# Patient Record
Sex: MALE | Race: ASIAN | NOT HISPANIC OR LATINO | Employment: STUDENT | URBAN - METROPOLITAN AREA
[De-identification: names, ages, dates, MRNs, and addresses within clinical notes are randomized per-mention and may not be internally consistent; named-entity substitution may affect disease eponyms.]

---

## 2024-05-31 ENCOUNTER — OFFICE VISIT (OUTPATIENT)
Dept: URGENT CARE | Facility: URGENT CARE | Age: 24
End: 2024-05-31
Payer: COMMERCIAL

## 2024-05-31 VITALS
HEART RATE: 93 BPM | DIASTOLIC BLOOD PRESSURE: 77 MMHG | TEMPERATURE: 97.8 F | WEIGHT: 119.8 LBS | RESPIRATION RATE: 18 BRPM | OXYGEN SATURATION: 99 % | SYSTOLIC BLOOD PRESSURE: 134 MMHG

## 2024-05-31 DIAGNOSIS — R35.0 URINARY FREQUENCY: ICD-10-CM

## 2024-05-31 DIAGNOSIS — R30.0 DYSURIA: Primary | ICD-10-CM

## 2024-05-31 LAB
ALBUMIN UR-MCNC: NEGATIVE MG/DL
APPEARANCE UR: CLEAR
BILIRUB UR QL STRIP: NEGATIVE
COLOR UR AUTO: YELLOW
GLUCOSE BLD-MCNC: 101 MG/DL (ref 60–99)
GLUCOSE UR STRIP-MCNC: NEGATIVE MG/DL
HBA1C MFR BLD: 5.4 % (ref 0–5.6)
HGB UR QL STRIP: NEGATIVE
KETONES UR STRIP-MCNC: NEGATIVE MG/DL
LEUKOCYTE ESTERASE UR QL STRIP: NEGATIVE
NITRATE UR QL: NEGATIVE
PH UR STRIP: 7 [PH] (ref 5–7)
SP GR UR STRIP: 1.01 (ref 1–1.03)
UROBILINOGEN UR STRIP-ACNC: 0.2 E.U./DL

## 2024-05-31 PROCEDURE — 81003 URINALYSIS AUTO W/O SCOPE: CPT | Performed by: PHYSICIAN ASSISTANT

## 2024-05-31 PROCEDURE — 36415 COLL VENOUS BLD VENIPUNCTURE: CPT | Performed by: PHYSICIAN ASSISTANT

## 2024-05-31 PROCEDURE — 99203 OFFICE O/P NEW LOW 30 MIN: CPT | Performed by: PHYSICIAN ASSISTANT

## 2024-05-31 PROCEDURE — 83036 HEMOGLOBIN GLYCOSYLATED A1C: CPT | Performed by: PHYSICIAN ASSISTANT

## 2024-05-31 PROCEDURE — 82947 ASSAY GLUCOSE BLOOD QUANT: CPT | Performed by: PHYSICIAN ASSISTANT

## 2024-05-31 RX ORDER — SULFAMETHOXAZOLE/TRIMETHOPRIM 800-160 MG
1 TABLET ORAL 2 TIMES DAILY
Qty: 20 TABLET | Refills: 0 | Status: SHIPPED | OUTPATIENT
Start: 2024-05-31 | End: 2024-06-10

## 2024-05-31 ASSESSMENT — PAIN SCALES - GENERAL: PAINLEVEL: MODERATE PAIN (4)

## 2024-06-03 ENCOUNTER — OFFICE VISIT (OUTPATIENT)
Dept: FAMILY MEDICINE | Facility: CLINIC | Age: 24
End: 2024-06-03
Payer: COMMERCIAL

## 2024-06-03 ENCOUNTER — TELEPHONE (OUTPATIENT)
Dept: FAMILY MEDICINE | Facility: CLINIC | Age: 24
End: 2024-06-03

## 2024-06-03 VITALS
HEART RATE: 101 BPM | WEIGHT: 117 LBS | BODY MASS INDEX: 19.49 KG/M2 | DIASTOLIC BLOOD PRESSURE: 73 MMHG | RESPIRATION RATE: 16 BRPM | OXYGEN SATURATION: 100 % | HEIGHT: 65 IN | TEMPERATURE: 99.8 F | SYSTOLIC BLOOD PRESSURE: 122 MMHG

## 2024-06-03 DIAGNOSIS — K59.00 CONSTIPATION, UNSPECIFIED CONSTIPATION TYPE: ICD-10-CM

## 2024-06-03 DIAGNOSIS — N50.811 RIGHT TESTICULAR PAIN: Primary | ICD-10-CM

## 2024-06-03 PROCEDURE — 99214 OFFICE O/P EST MOD 30 MIN: CPT

## 2024-06-03 ASSESSMENT — PATIENT HEALTH QUESTIONNAIRE - PHQ9
10. IF YOU CHECKED OFF ANY PROBLEMS, HOW DIFFICULT HAVE THESE PROBLEMS MADE IT FOR YOU TO DO YOUR WORK, TAKE CARE OF THINGS AT HOME, OR GET ALONG WITH OTHER PEOPLE: NOT DIFFICULT AT ALL
SUM OF ALL RESPONSES TO PHQ QUESTIONS 1-9: 4
SUM OF ALL RESPONSES TO PHQ QUESTIONS 1-9: 4

## 2024-06-03 NOTE — PROGRESS NOTES
Assessment & Plan     Right testicular pain  Was seen at urgent care 5/31 for urinary frequency and pain. UA and hgbA1C normal and was prescribed 10 days of bactrim. Patient reports improvement to back pain but continues to have testicular pain, has been taking antibiotics as prescribed. Chlamydia and gonorrhea tests collected. Differential diagnosis includes epididymitis, hernia, testicular torsion or constipation. Low suspicion for hernia or testicular torsion but testicular ultrasound ordered for further evaluation. Plan for patient to continue antibiotics as prescribed. Discussed with patient that if he develops significant pain, to go to ER for further evaluation.   - US Testicular & Scrotum w Doppler Ltd; Future  - NEISSERIA GONORRHOEA PCR; Future  - CHLAMYDIA TRACHOMATIS PCR; Future  - Neisseria gonorrhoeae PCR  - Chlamydia trachomatis PCR    Constipation, unspecified constipation type  Continues to have daily stools but 1 and 2 on bristol stool chart. Reports that this has been going on for two weeks. Has not used any over the counter treatments. Plan for patient to use miralax to help with hard stools.     Deann Felder is a 24 year old, presenting for the following health issues:  Follow Up (UC visit)      6/3/2024     3:23 PM   Additional Questions   Roomed by peg corona     History of Present Illness       Reason for visit:  Frquent urination and testicle pain    He eats 0-1 servings of fruits and vegetables daily.He consumes 0 sweetened beverage(s) daily.He exercises with enough effort to increase his heart rate 9 or less minutes per day.  He exercises with enough effort to increase his heart rate 3 or less days per week.   He is taking medications regularly.         ED/UC Followup:    Facility:  [facility]  Date of visit: 5/31/24  Reason for visit: Urinary problem  Current Status: rt testicle pain, rt pelvic pain  Genitourinary - Male  Onset/Duration: 5/30/24  Description:   Dysuria (painful  "urination): No}  Hematuria (blood in urine): No  Frequency: YES  Waking at night to urinate: No  Hesitancy (delay in urine): No  Retention (unable to empty): No  Decrease in urinary flow: No  Incontinence: No  Progression of Symptoms:  testicle pain worsening, back pain gone  Accompanying Signs & Symptoms:  Fever: No  Back/Flank pain: No  Urethral discharge: No  Testicle lumps/masses/pain: YES- pain  Nausea and/or vomiting: No  Abdominal pain: rt pelvic pain  History:   History of frequent UTI s: No  History of kidney stones: No  History of hernias: No  Personal or Family history of Prostate problems: No  Sexually active: No  Precipitating or alleviating factors: does have some dribbling, pt reports feeling dull  Therapies tried and outcome: abx    Right testicular that extends to right groin. Has not had surgery on area, First started Thursday, went into urgent care on Friday. Was noticing that he was having frequent urination. Testicular pain started Saturday night and back pain has since resolved. Has had hard stools for two weeks, has stools daily possibly constipated. Stools have been a type 1 or 2 on bristol stool chart for the last two weeks, no blood in stools, or black stools. Declines being sexually active. Has not noticed any lumps or masses in testicles. Pain is mostly right groin. Significant pain when walking 8/10. When at rest, pain improves.         Objective    /73 (BP Location: Right arm, Patient Position: Sitting, Cuff Size: Adult Small)   Pulse 101   Temp 99.8  F (37.7  C) (Temporal)   Resp 16   Ht 1.651 m (5' 5\")   Wt 53.1 kg (117 lb)   SpO2 100%   BMI 19.47 kg/m    Body mass index is 19.47 kg/m .  Physical Exam   GENERAL: alert and no distress  ABDOMEN: soft, nontender, no hepatosplenomegaly, no masses and bowel sounds normal   (male): testicles normal without atrophy or masses, no hernias, and penis normal without urethral discharge, no change in pain when lifting testicles up. "   SKIN: no suspicious lesions or rashes            Signed Electronically by: CAMILA Landaverde CNP

## 2024-06-03 NOTE — TELEPHONE ENCOUNTER
Reason for Call:  Appointment Request    Patient requesting this type of appt:  Hospital/ED Follow-Up     Requested provider:  Open     Reason patient unable to be scheduled: Not within requested timeframe    When does patient want to be seen/preferred time: 1-2 days    Comments: He is looking for later today or tomorrow for a follow up     Could we send this information to you in [employer] or would you prefer to receive a phone call?:   Patient would prefer a phone call   Okay to leave a detailed message?: Yes at Cell number on file:    Telephone Information:   Mobile [phone]       Call taken on 6/3/2024 at 11:02 AM by Bertha Gtz

## 2024-06-05 ENCOUNTER — ANCILLARY PROCEDURE (OUTPATIENT)
Dept: ULTRASOUND IMAGING | Facility: CLINIC | Age: 24
End: 2024-06-05
Payer: COMMERCIAL

## 2024-06-05 ENCOUNTER — LAB (OUTPATIENT)
Dept: LAB | Facility: CLINIC | Age: 24
End: 2024-06-05
Payer: COMMERCIAL

## 2024-06-05 DIAGNOSIS — N50.811 RIGHT TESTICULAR PAIN: ICD-10-CM

## 2024-06-05 LAB
C TRACH DNA SPEC QL NAA+PROBE: NEGATIVE
N GONORRHOEA DNA SPEC QL NAA+PROBE: NEGATIVE

## 2024-06-05 PROCEDURE — 87591 N.GONORRHOEAE DNA AMP PROB: CPT

## 2024-06-05 PROCEDURE — 76870 US EXAM SCROTUM: CPT | Mod: TC | Performed by: RADIOLOGY

## 2024-06-05 PROCEDURE — 93976 VASCULAR STUDY: CPT | Mod: TC | Performed by: RADIOLOGY

## 2024-06-05 PROCEDURE — 87491 CHLMYD TRACH DNA AMP PROBE: CPT

## 2024-06-10 ENCOUNTER — OFFICE VISIT (OUTPATIENT)
Dept: FAMILY MEDICINE | Facility: CLINIC | Age: 24
End: 2024-06-10
Payer: COMMERCIAL

## 2024-06-10 VITALS
SYSTOLIC BLOOD PRESSURE: 123 MMHG | RESPIRATION RATE: 12 BRPM | TEMPERATURE: 99.8 F | BODY MASS INDEX: 19.3 KG/M2 | DIASTOLIC BLOOD PRESSURE: 79 MMHG | HEART RATE: 109 BPM | OXYGEN SATURATION: 100 % | WEIGHT: 116 LBS

## 2024-06-10 DIAGNOSIS — M54.9 ACUTE BILATERAL BACK PAIN, UNSPECIFIED BACK LOCATION: ICD-10-CM

## 2024-06-10 DIAGNOSIS — N41.0 ACUTE PROSTATITIS: Primary | ICD-10-CM

## 2024-06-10 DIAGNOSIS — R35.0 URINARY FREQUENCY: ICD-10-CM

## 2024-06-10 LAB
ALBUMIN UR-MCNC: NEGATIVE MG/DL
ANION GAP SERPL CALCULATED.3IONS-SCNC: 11 MMOL/L (ref 7–15)
APPEARANCE UR: CLEAR
BILIRUB UR QL STRIP: NEGATIVE
BUN SERPL-MCNC: 7.7 MG/DL (ref 6–20)
CALCIUM SERPL-MCNC: 9.2 MG/DL (ref 8.6–10)
CHLORIDE SERPL-SCNC: 101 MMOL/L (ref 98–107)
COLOR UR AUTO: YELLOW
CREAT SERPL-MCNC: 1.04 MG/DL (ref 0.67–1.17)
DEPRECATED HCO3 PLAS-SCNC: 23 MMOL/L (ref 22–29)
EGFRCR SERPLBLD CKD-EPI 2021: >90 ML/MIN/1.73M2
GLUCOSE SERPL-MCNC: 96 MG/DL (ref 70–99)
GLUCOSE UR STRIP-MCNC: NEGATIVE MG/DL
HGB UR QL STRIP: NEGATIVE
KETONES UR STRIP-MCNC: NEGATIVE MG/DL
LEUKOCYTE ESTERASE UR QL STRIP: NEGATIVE
NITRATE UR QL: NEGATIVE
PH UR STRIP: 6 [PH] (ref 5–7)
POTASSIUM SERPL-SCNC: 4.7 MMOL/L (ref 3.4–5.3)
RBC #/AREA URNS AUTO: NORMAL /HPF
SODIUM SERPL-SCNC: 135 MMOL/L (ref 135–145)
SP GR UR STRIP: <=1.005 (ref 1–1.03)
UROBILINOGEN UR STRIP-ACNC: 0.2 E.U./DL
WBC #/AREA URNS AUTO: NORMAL /HPF

## 2024-06-10 PROCEDURE — 36415 COLL VENOUS BLD VENIPUNCTURE: CPT

## 2024-06-10 PROCEDURE — 85025 COMPLETE CBC W/AUTO DIFF WBC: CPT

## 2024-06-10 PROCEDURE — 99213 OFFICE O/P EST LOW 20 MIN: CPT

## 2024-06-10 PROCEDURE — 81001 URINALYSIS AUTO W/SCOPE: CPT

## 2024-06-10 PROCEDURE — 80048 BASIC METABOLIC PNL TOTAL CA: CPT

## 2024-06-10 RX ORDER — CIPROFLOXACIN 500 MG/1
500 TABLET, FILM COATED ORAL 2 TIMES DAILY
Qty: 56 TABLET | Refills: 0 | Status: SHIPPED | OUTPATIENT
Start: 2024-06-10 | End: 2024-07-02

## 2024-06-10 ASSESSMENT — ENCOUNTER SYMPTOMS: BACK PAIN: 1

## 2024-06-10 NOTE — PROGRESS NOTES
Assessment & Plan     Acute prostatitis  Pain when palpating prostate. Symptoms most likely prostatitis. Testicular ultrasound completed a couple of days ago and negative, Chlamydia and Gonorrhea test negative. Will completed UA, CBC and BMP for rule out of other cause. Patient has already been on bactrim but states his symptoms has worsened since being on it so will switch to cipro and plan for him to be on this for one month.   - ciprofloxacin (CIPRO) 500 MG tablet; Take 1 tablet (500 mg) by mouth 2 times daily for 28 days    Urinary frequency  - CBC with platelets and differential; Future  - Basic metabolic panel  (Ca, Cl, CO2, Creat, Gluc, K, Na, BUN); Future  - UA with Microscopic reflex to Culture - lab collect; Future  - CBC with platelets and differential  - Basic metabolic panel  (Ca, Cl, CO2, Creat, Gluc, K, Na, BUN)  - UA with Microscopic reflex to Culture - lab collect  - UA Microscopic with Reflex to Culture    Acute bilateral back pain, unspecified back location  Pain to pelvic lower back, low suspicion for kidney involvement due to location of pain.   - CBC with platelets and differential; Future  - Basic metabolic panel  (Ca, Cl, CO2, Creat, Gluc, K, Na, BUN); Future  - UA with Microscopic reflex to Culture - lab collect; Future  - CBC with platelets and differential  - Basic metabolic panel  (Ca, Cl, CO2, Creat, Gluc, K, Na, BUN)  - UA with Microscopic reflex to Culture - lab collect  - UA Microscopic with Reflex to Culture      Subjective   Bradford is a 24 year old, presenting for the following health issues:  Back Pain (Lt side worse back pain), Flank Pain (Lt side), and Urinary Frequency      6/10/2024     9:45 AM   Additional Questions   Roomed by peg corona     Back Pain            Genitourinary - Male  Onset/Duration: 1+ week  Description:   Dysuria (painful urination): No}  Hematuria (blood in urine): No  Frequency: YES-urinary dribbling  Waking at night to urinate: No  Hesitancy (delay in  urine): No  Retention (unable to empty): YES  Decrease in urinary flow: No  Incontinence: No  Progression of Symptoms:  back pain worsening and having fevers  Accompanying Signs & Symptoms:  Fever: YES: yesterday 99.8 and slight fever,   Back/Flank pain: YES-left and right side  Urethral discharge: No  Testicle lumps/masses/pain: No  Nausea and/or vomiting: No  Abdominal pain: lt side flank pain  History:   History of frequent UTI s: No  History of kidney stones: No  History of hernias: No  Personal or Family history of Prostate problems: No  Sexually active: No  Precipitating or alleviating factors: pt reports that his back pain has worsened, lt side pain and he has developed fever and chills.  Fever yesterday and better today.    Therapies tried and outcome: course of antibiotics - bactrim    Has two doses of bactrim left. Symptoms have not improved. Had low grade temp the other day. Testicular pain has resolved. Dad has a history of kidney stones but no reports of blood in urine.         Objective    /79 (BP Location: Right arm, Patient Position: Sitting, Cuff Size: Adult Regular)   Pulse 109   Temp 99.8  F (37.7  C) (Temporal)   Resp 12   Wt 52.6 kg (116 lb)   SpO2 100%   BMI 19.30 kg/m    Body mass index is 19.3 kg/m .  Physical Exam   GENERAL: alert and no distress  RESP: lungs clear to auscultation - no rales, rhonchi or wheezes  CV: regular rate and rhythm, normal S1 S2, no S3 or S4, no murmur, click or rub, no peripheral edema  ABDOMEN: soft, nontender, no hepatosplenomegaly, no masses and bowel sounds normal  RECTAL: normal sphincter tone, no rectal masses and prostate boggy, tenderness present          Signed Electronically by: CAMILA Landaverde CNP

## 2024-06-11 LAB
ACANTHOCYTES BLD QL SMEAR: ABNORMAL
AUER BODIES BLD QL SMEAR: ABNORMAL
BASO STIPL BLD QL SMEAR: ABNORMAL
BASOPHILS # BLD AUTO: 0 10E3/UL (ref 0–0.2)
BASOPHILS NFR BLD AUTO: 1 %
BITE CELLS BLD QL SMEAR: ABNORMAL
BLISTER CELLS BLD QL SMEAR: ABNORMAL
BURR CELLS BLD QL SMEAR: ABNORMAL
DACRYOCYTES BLD QL SMEAR: ABNORMAL
ELLIPTOCYTES BLD QL SMEAR: SLIGHT
EOSINOPHIL # BLD AUTO: 0.1 10E3/UL (ref 0–0.7)
EOSINOPHIL NFR BLD AUTO: 2 %
ERYTHROCYTE [DISTWIDTH] IN BLOOD BY AUTOMATED COUNT: 18.3 % (ref 10–15)
FRAGMENTS BLD QL SMEAR: SLIGHT
GIANT PLATELETS BLD QL SMEAR: ABNORMAL
HCT VFR BLD AUTO: 41.8 % (ref 40–53)
HGB BLD-MCNC: 12.9 G/DL (ref 13.3–17.7)
HGB C CRYSTALS: ABNORMAL
HOWELL-JOLLY BOD BLD QL SMEAR: ABNORMAL
IMM GRANULOCYTES # BLD: 0 10E3/UL
IMM GRANULOCYTES NFR BLD: 0 %
LYMPHOCYTES # BLD AUTO: 0.7 10E3/UL (ref 0.8–5.3)
LYMPHOCYTES NFR BLD AUTO: 15 %
MCH RBC QN AUTO: 18.8 PG (ref 26.5–33)
MCHC RBC AUTO-ENTMCNC: 30.9 G/DL (ref 31.5–36.5)
MCV RBC AUTO: 61 FL (ref 78–100)
MONOCYTES # BLD AUTO: 0.8 10E3/UL (ref 0–1.3)
MONOCYTES NFR BLD AUTO: 17 %
NEUTROPHILS # BLD AUTO: 3.1 10E3/UL (ref 1.6–8.3)
NEUTROPHILS NFR BLD AUTO: 65 %
NEUTS HYPERSEG BLD QL SMEAR: ABNORMAL
NRBC # BLD AUTO: 0 10E3/UL
NRBC BLD AUTO-RTO: 0 /100
PATH REV: ABNORMAL
PLAT MORPH BLD: ABNORMAL
PLATELET # BLD AUTO: 154 10E3/UL (ref 150–450)
POLYCHROMASIA BLD QL SMEAR: ABNORMAL
RBC # BLD AUTO: 6.87 10E6/UL (ref 4.4–5.9)
RBC AGGLUT BLD QL: ABNORMAL
RBC MORPH BLD: ABNORMAL
ROULEAUX BLD QL SMEAR: ABNORMAL
SICKLE CELLS BLD QL SMEAR: ABNORMAL
SMUDGE CELLS BLD QL SMEAR: ABNORMAL
SPHEROCYTES BLD QL SMEAR: ABNORMAL
STOMATOCYTES BLD QL SMEAR: ABNORMAL
TARGETS BLD QL SMEAR: ABNORMAL
TOXIC GRANULES BLD QL SMEAR: ABNORMAL
VARIANT LYMPHS BLD QL SMEAR: ABNORMAL
WBC # BLD AUTO: 4.7 10E3/UL (ref 4–11)

## 2024-06-11 NOTE — RESULT ENCOUNTER NOTE
Khang Felder,     Your kidney and urine labs came back normal. You also do not have elevated WBC count which is good.    Your hemoglobin is slightly low and looks like you have have low iron. I recommend that you follow up in clinic in the next couple of weeks to discuss this further and possibly complete more blood work.     If you have any questions send me a message on [employer].     ROBYN Valladares

## 2024-07-02 ENCOUNTER — OFFICE VISIT (OUTPATIENT)
Dept: FAMILY MEDICINE | Facility: CLINIC | Age: 24
End: 2024-07-02
Payer: COMMERCIAL

## 2024-07-02 ENCOUNTER — MYC MEDICAL ADVICE (OUTPATIENT)
Dept: FAMILY MEDICINE | Facility: CLINIC | Age: 24
End: 2024-07-02

## 2024-07-02 VITALS
SYSTOLIC BLOOD PRESSURE: 131 MMHG | HEART RATE: 91 BPM | OXYGEN SATURATION: 100 % | BODY MASS INDEX: 19.38 KG/M2 | WEIGHT: 120.6 LBS | TEMPERATURE: 98.5 F | HEIGHT: 66 IN | RESPIRATION RATE: 12 BRPM | DIASTOLIC BLOOD PRESSURE: 77 MMHG

## 2024-07-02 DIAGNOSIS — Z13.0 SCREENING FOR IRON DEFICIENCY ANEMIA: ICD-10-CM

## 2024-07-02 DIAGNOSIS — M79.10 MUSCLE PAIN: ICD-10-CM

## 2024-07-02 DIAGNOSIS — N41.0 ACUTE PROSTATITIS: Primary | ICD-10-CM

## 2024-07-02 DIAGNOSIS — R21 RASH: ICD-10-CM

## 2024-07-02 DIAGNOSIS — E61.1 IRON DEFICIENCY: Primary | ICD-10-CM

## 2024-07-02 LAB
ALBUMIN UR-MCNC: NEGATIVE MG/DL
APPEARANCE UR: CLEAR
BASOPHILS # BLD AUTO: 0 10E3/UL (ref 0–0.2)
BASOPHILS NFR BLD AUTO: 1 %
BILIRUB UR QL STRIP: NEGATIVE
COLOR UR AUTO: YELLOW
ELLIPTOCYTES BLD QL SMEAR: SLIGHT
EOSINOPHIL # BLD AUTO: 0.1 10E3/UL (ref 0–0.7)
EOSINOPHIL NFR BLD AUTO: 1 %
ERYTHROCYTE [DISTWIDTH] IN BLOOD BY AUTOMATED COUNT: 18.7 % (ref 10–15)
FERRITIN SERPL-MCNC: 44 NG/ML (ref 31–409)
FRAGMENTS BLD QL SMEAR: SLIGHT
GLUCOSE UR STRIP-MCNC: NEGATIVE MG/DL
HCT VFR BLD AUTO: 41.2 % (ref 40–53)
HGB BLD-MCNC: 12.4 G/DL (ref 13.3–17.7)
HGB UR QL STRIP: NEGATIVE
IMM GRANULOCYTES # BLD: 0.1 10E3/UL
IMM GRANULOCYTES NFR BLD: 1 %
IRON BINDING CAPACITY (ROCHE): 335 UG/DL (ref 240–430)
IRON SATN MFR SERPL: 25 % (ref 15–46)
IRON SERPL-MCNC: 83 UG/DL (ref 61–157)
KETONES UR STRIP-MCNC: NEGATIVE MG/DL
LEUKOCYTE ESTERASE UR QL STRIP: NEGATIVE
LYMPHOCYTES # BLD AUTO: 1.6 10E3/UL (ref 0.8–5.3)
LYMPHOCYTES NFR BLD AUTO: 25 %
MCH RBC QN AUTO: 18.6 PG (ref 26.5–33)
MCHC RBC AUTO-ENTMCNC: 30.1 G/DL (ref 31.5–36.5)
MCV RBC AUTO: 62 FL (ref 78–100)
MONOCYTES # BLD AUTO: 0.5 10E3/UL (ref 0–1.3)
MONOCYTES NFR BLD AUTO: 9 %
NEUTROPHILS # BLD AUTO: 3.9 10E3/UL (ref 1.6–8.3)
NEUTROPHILS NFR BLD AUTO: 64 %
NITRATE UR QL: NEGATIVE
NRBC # BLD AUTO: 0 10E3/UL
NRBC BLD AUTO-RTO: 0 /100
PH UR STRIP: 5.5 [PH] (ref 5–7)
PLAT MORPH BLD: ABNORMAL
PLATELET # BLD AUTO: 169 10E3/UL (ref 150–450)
RBC # BLD AUTO: 6.67 10E6/UL (ref 4.4–5.9)
RBC MORPH BLD: ABNORMAL
SP GR UR STRIP: 1.01 (ref 1–1.03)
TARGETS BLD QL SMEAR: SLIGHT
UROBILINOGEN UR STRIP-ACNC: 0.2 E.U./DL
WBC # BLD AUTO: 6.2 10E3/UL (ref 4–11)

## 2024-07-02 PROCEDURE — 82728 ASSAY OF FERRITIN: CPT

## 2024-07-02 PROCEDURE — 36415 COLL VENOUS BLD VENIPUNCTURE: CPT

## 2024-07-02 PROCEDURE — G2211 COMPLEX E/M VISIT ADD ON: HCPCS

## 2024-07-02 PROCEDURE — 99214 OFFICE O/P EST MOD 30 MIN: CPT

## 2024-07-02 PROCEDURE — 85025 COMPLETE CBC W/AUTO DIFF WBC: CPT

## 2024-07-02 PROCEDURE — 81003 URINALYSIS AUTO W/O SCOPE: CPT

## 2024-07-02 PROCEDURE — 83540 ASSAY OF IRON: CPT

## 2024-07-02 PROCEDURE — 83550 IRON BINDING TEST: CPT

## 2024-07-02 RX ORDER — CIPROFLOXACIN 500 MG/1
500 TABLET, FILM COATED ORAL 2 TIMES DAILY
Qty: 28 TABLET | Refills: 0 | Status: SHIPPED | OUTPATIENT
Start: 2024-07-02 | End: 2024-07-16

## 2024-07-02 NOTE — RESULT ENCOUNTER NOTE
Khang Felder,    Your iron levels came back on the lower side of normal. Because of this, I recommend that you take a daily multivitamin with iron in it to increase your iron levels.     Your WBC came back normal indicating no infection which is good. Continue to take the Cipro though as we discussed in clinic.     As for you muscle aches while taking the antibiotic, I recommend taking it easy with exercise. You can continue to run but I wouldn't run extremely long distances.     Let me know if you have any questions and I will see you in a couple of weeks.     Shawna

## 2024-07-02 NOTE — PROGRESS NOTES
Assessment & Plan     Acute prostatitis  Patient continues to have mild symptoms of prostatitis but symptoms have improved. Will be finished with cipro within the next 4-5 days (Will have been on for 28 days). Because of this, will extend cipro for two more weeks for a total of 6 weeks of treatment. Will get a UA for further evaluation. Plan for patient to follow up in 2 weeks for evaluation of discontinuation of symptoms.   - ciprofloxacin (CIPRO) 500 MG tablet; Take 1 tablet (500 mg) by mouth 2 times daily for 14 days  - UA Macroscopic with reflex to Microscopic and Culture - Lab Collect; Future  - UA Macroscopic with reflex to Microscopic and Culture - Lab Collect    Muscle pain  Most likely due to cipro. Appears to be mild and has moved from wrist to shoulders. Encouraged patient to not complete strenuous exercise to prevent tendon rupture associated with cipro.     Rash  Itching to beard. Uses night face wash. Encourage patient to use thick moisturizer to prevent dryness as this may be causing itching.     Screening for iron deficiency anemia  Previous CBC indicated Elliptocytes and possibly iron deficiency anemia. CBC, ferritin and iron studies ordered for further evaluation.   - Ferritin; Future  - Iron & Iron Binding Capacity; Future  - CBC with platelets and differential; Future  - Ferritin  - Iron & Iron Binding Capacity  - CBC with platelets and differential    The longitudinal plan of care for the diagnosis(es)/condition(s) as documented were addressed during this visit. Due to the added complexity in care, I will continue to support Bradford in the subsequent management and with ongoing continuity of care.    Subjective   Bradford is a 24 year old, presenting for the following health issues:  Follow Up (prostatitis), Derm Problem (Itchy face), Muscle Pain (Shoulders and arms), and Chills (2 weeks)      7/2/2024     7:48 AM   Additional Questions   Roomed by peg corona     History of Present Illness  "      Reason for visit:  Follow up and muscle pain    He eats 2-3 servings of fruits and vegetables daily.He consumes 0 sweetened beverage(s) daily.He exercises with enough effort to increase his heart rate 9 or less minutes per day.  He exercises with enough effort to increase his heart rate 3 or less days per week.   He is taking medications regularly.         Pt reports itchiness on face since last week- no rash or redness. Comes and goes. No redness or bumps comes and goes.   Pt reports muscle aches in shoulders and arms for 1 week.  Shoulders just recently but arm pain went away.  Shoulder bilateral, aching, mild, no strenuous activity.   Pt reports chills for 2 weeks in the morning lasting only 10-15 minutes.   Pt reports enlarged prostate since last week and frequent urination has not improved.  Stream is weaker.     Pain has improved, when sitting and standing for a prolonged period of time pain occurs. Drinking a lot of water.         Objective    /77 (BP Location: Right arm, Patient Position: Sitting, Cuff Size: Adult Regular)   Pulse 91   Temp 98.5  F (36.9  C) (Temporal)   Resp 12   Ht 1.664 m (5' 5.5\")   Wt 54.7 kg (120 lb 9.6 oz)   SpO2 100%   BMI 19.76 kg/m    Body mass index is 19.76 kg/m .  Physical Exam   GENERAL: alert and no distress  RESP: lungs clear to auscultation - no rales, rhonchi or wheezes  CV: regular rate and rhythm, normal S1 S2, no S3 or S4, no murmur, click or rub, no peripheral edema  MS: no gross musculoskeletal defects noted, no edema  SKIN: no suspicious lesions or rashes            Signed Electronically by: CAMILA Landaverde CNP    "

## 2024-07-03 PROBLEM — E61.1 IRON DEFICIENCY: Status: ACTIVE | Noted: 2024-07-03

## 2024-07-03 RX ORDER — MULTIPLE VITAMINS W/ MINERALS TAB 9MG-400MCG
1 TAB ORAL DAILY
Qty: 90 TABLET | Refills: 3 | COMMUNITY
End: 2024-07-03

## 2024-07-03 RX ORDER — MULTIPLE VITAMINS W/ MINERALS TAB 9MG-400MCG
1 TAB ORAL DAILY
Qty: 90 TABLET | Refills: 3 | Status: SHIPPED | OUTPATIENT
Start: 2024-07-03 | End: 2024-09-04

## 2024-07-03 NOTE — TELEPHONE ENCOUNTER
"Result note from labs completed 7/2/24      \"Hi Georginayan,     Your iron levels came back on the lower side of normal. Because of this, I recommend that you take a daily multivitamin with iron in it to increase your iron levels.     Your WBC came back normal indicating no infection which is good. Continue to take the Cipro though as we discussed in clinic.     As for you muscle aches while taking the antibiotic, I recommend taking it easy with exercise. You can continue to run but I wouldn't run extremely long distances.     Let me know if you have any questions and I will see you in a couple of weeks.     Shawna\"      Yamilet Angel RN on 7/3/2024 at 8:59 AM    "

## 2024-07-16 ENCOUNTER — OFFICE VISIT (OUTPATIENT)
Dept: FAMILY MEDICINE | Facility: CLINIC | Age: 24
End: 2024-07-16
Payer: COMMERCIAL

## 2024-07-16 VITALS
HEART RATE: 99 BPM | DIASTOLIC BLOOD PRESSURE: 79 MMHG | HEIGHT: 66 IN | OXYGEN SATURATION: 100 % | TEMPERATURE: 98.5 F | BODY MASS INDEX: 19.35 KG/M2 | WEIGHT: 120.4 LBS | SYSTOLIC BLOOD PRESSURE: 130 MMHG

## 2024-07-16 DIAGNOSIS — K21.00 GASTROESOPHAGEAL REFLUX DISEASE WITH ESOPHAGITIS WITHOUT HEMORRHAGE: ICD-10-CM

## 2024-07-16 DIAGNOSIS — N41.0 ACUTE PROSTATITIS: Primary | ICD-10-CM

## 2024-07-16 PROCEDURE — 99214 OFFICE O/P EST MOD 30 MIN: CPT

## 2024-07-16 NOTE — PROGRESS NOTES
"  Assessment & Plan     Acute prostatitis  Improved, patient continues to have urinary frequency but reports improvement. Will be finished with his antibiotics in 3 days. Patient reports no systemic symptoms of infection. Plan for patient to follow up if symptoms reappear or if urinary frequency does not improve.     Gastroesophageal reflux disease with esophagitis without hemorrhage  Reports LUQ pain that resolves with belching for two days. Occurred after he was eating pizza. Symptoms appear to be related to GERD. Plan for patient to use pepcid as needed for symptoms and return to clinic if he develops black or bloody stools.     Deann Felder is a 24 year old, presenting for the following health issues:  Follow Up and Rib Pain (Burping after rib pain)      7/16/2024     4:46 PM   Additional Questions   Roomed by peg corona     History of Present Illness       Reason for visit:  Follow up and pain in lower left rib and burps    He eats 2-3 servings of fruits and vegetables daily.He consumes 0 sweetened beverage(s) daily.He exercises with enough effort to increase his heart rate 9 or less minutes per day.  He exercises with enough effort to increase his heart rate 3 or less days per week.   He is taking medications regularly.         Objective    /79 (BP Location: Left arm, Patient Position: Sitting, Cuff Size: Adult Regular)   Pulse 99   Temp 98.5  F (36.9  C) (Temporal)   Ht 1.664 m (5' 5.5\")   Wt 54.6 kg (120 lb 6.4 oz)   SpO2 100%   BMI 19.73 kg/m    Body mass index is 19.73 kg/m .  Physical Exam   GENERAL: alert and no distress  RESP: lungs clear to auscultation - no rales, rhonchi or wheezes  CV: regular rate and rhythm, normal S1 S2, no S3 or S4, no murmur, click or rub, no peripheral edema  ABDOMEN: soft, nontender, no hepatosplenomegaly, no masses and bowel sounds normal            Signed Electronically by: CAMILA Landaverde CNP    "

## 2024-07-23 ENCOUNTER — OFFICE VISIT (OUTPATIENT)
Dept: FAMILY MEDICINE | Facility: CLINIC | Age: 24
End: 2024-07-23
Payer: COMMERCIAL

## 2024-07-23 VITALS
DIASTOLIC BLOOD PRESSURE: 83 MMHG | WEIGHT: 119.6 LBS | OXYGEN SATURATION: 100 % | BODY MASS INDEX: 19.22 KG/M2 | TEMPERATURE: 98.6 F | HEIGHT: 66 IN | HEART RATE: 94 BPM | SYSTOLIC BLOOD PRESSURE: 131 MMHG | RESPIRATION RATE: 12 BRPM

## 2024-07-23 DIAGNOSIS — K27.9 PEPTIC ULCER: Primary | ICD-10-CM

## 2024-07-23 DIAGNOSIS — N41.0 ACUTE PROSTATITIS: ICD-10-CM

## 2024-07-23 LAB
ACANTHOCYTES BLD QL SMEAR: SLIGHT
BASOPHILS # BLD AUTO: 0.1 10E3/UL (ref 0–0.2)
BASOPHILS NFR BLD AUTO: 1 %
ELLIPTOCYTES BLD QL SMEAR: SLIGHT
EOSINOPHIL # BLD AUTO: 0.1 10E3/UL (ref 0–0.7)
EOSINOPHIL NFR BLD AUTO: 1 %
ERYTHROCYTE [DISTWIDTH] IN BLOOD BY AUTOMATED COUNT: 18.7 % (ref 10–15)
FRAGMENTS BLD QL SMEAR: SLIGHT
HCT VFR BLD AUTO: 41.9 % (ref 40–53)
HGB BLD-MCNC: 12.9 G/DL (ref 13.3–17.7)
IMM GRANULOCYTES # BLD: 0 10E3/UL
IMM GRANULOCYTES NFR BLD: 0 %
LYMPHOCYTES # BLD AUTO: 1.5 10E3/UL (ref 0.8–5.3)
LYMPHOCYTES NFR BLD AUTO: 20 %
MCH RBC QN AUTO: 19.1 PG (ref 26.5–33)
MCHC RBC AUTO-ENTMCNC: 30.8 G/DL (ref 31.5–36.5)
MCV RBC AUTO: 62 FL (ref 78–100)
MONOCYTES # BLD AUTO: 0.5 10E3/UL (ref 0–1.3)
MONOCYTES NFR BLD AUTO: 7 %
NEUTROPHILS # BLD AUTO: 5.5 10E3/UL (ref 1.6–8.3)
NEUTROPHILS NFR BLD AUTO: 72 %
NRBC # BLD AUTO: 0 10E3/UL
NRBC BLD AUTO-RTO: 0 /100
PLAT MORPH BLD: ABNORMAL
PLATELET # BLD AUTO: 215 10E3/UL (ref 150–450)
RBC # BLD AUTO: 6.74 10E6/UL (ref 4.4–5.9)
RBC MORPH BLD: ABNORMAL
TARGETS BLD QL SMEAR: SLIGHT
WBC # BLD AUTO: 7.7 10E3/UL (ref 4–11)

## 2024-07-23 PROCEDURE — 36415 COLL VENOUS BLD VENIPUNCTURE: CPT

## 2024-07-23 PROCEDURE — G2211 COMPLEX E/M VISIT ADD ON: HCPCS

## 2024-07-23 PROCEDURE — 80076 HEPATIC FUNCTION PANEL: CPT

## 2024-07-23 PROCEDURE — 99213 OFFICE O/P EST LOW 20 MIN: CPT

## 2024-07-23 PROCEDURE — 85025 COMPLETE CBC W/AUTO DIFF WBC: CPT

## 2024-07-23 ASSESSMENT — ENCOUNTER SYMPTOMS: COUGH: 1

## 2024-07-23 NOTE — PROGRESS NOTES
Assessment & Plan     Peptic ulcer  Symptoms appear to be related to peptic ulcer. CBC stable. Will have patient use omeprazole twice a day and follow up in two weeks. Plan to also check h.pylori and liver levels for further evaluation.   - CBC with platelets and differential; Future  - omeprazole (PRILOSEC) 20 MG DR capsule; Take 1 capsule (20 mg) by mouth 2 times daily for 14 days  - Hepatic panel (Albumin, ALT, AST, Bili, Alk Phos, TP); Future  - Helicobacter pylori Antigen Stool; Future  - CBC with platelets and differential  - Hepatic panel (Albumin, ALT, AST, Bili, Alk Phos, TP)  - Helicobacter pylori Antigen Stool    Acute prostatitis  Patient reports improvement in pain and decrease in urinary frequency. Continues to have pain which I plan to monitor and reevaluate in two weeks.     The longitudinal plan of care for the diagnosis(es)/condition(s) as documented were addressed during this visit. Due to the added complexity in care, I will continue to support Bradford in the subsequent management and with ongoing continuity of care.    Deann Felder is a 24 year old, presenting for the following health issues:  Cough, Rib Pain, and belching      7/23/2024     2:20 PM   Additional Questions   Roomed by peg Nevarez    History of Present Illness       Reason for visit:  Follow up and pain in lower left rib and burps    He eats 2-3 servings of fruits and vegetables daily.He consumes 0 sweetened beverage(s) daily.He exercises with enough effort to increase his heart rate 9 or less minutes per day.  He exercises with enough effort to increase his heart rate 3 or less days per week.   He is taking medications regularly.           Pt complains of cough, belching and lt lower rib pain- goes away after belching and repeats multiple times a day.  Pt reports he quit taking his multivitamin 3 days ago.  Pt experienced brownish/black stools this morning.  2nd time of passing stools were green today. Pt also  "reports a dull pain in scrotom.                Objective    /83 (BP Location: Right arm, Patient Position: Sitting, Cuff Size: Adult Small)   Pulse 94   Temp 98.6  F (37  C) (Temporal)   Resp 12   Ht 1.664 m (5' 5.5\")   Wt 54.3 kg (119 lb 9.6 oz)   SpO2 100%   BMI 19.60 kg/m    Body mass index is 19.6 kg/m .  Physical Exam   GENERAL: alert and no distress  RESP: lungs clear to auscultation - no rales, rhonchi or wheezes  CV: regular rate and rhythm, normal S1 S2, no S3 or S4, no murmur, click or rub, no peripheral edema  ABDOMEN: soft, slight tenderness LUQ and epigastric and bowel sounds normal  PSYCH: mentation appears normal, affect normal/bright    Results for orders placed or performed in visit on 07/23/24 (from the past 24 hour(s))   CBC with platelets and differential    Narrative    The following orders were created for panel order CBC with platelets and differential.  Procedure                               Abnormality         Status                     ---------                               -----------         ------                     CBC with platelets and d...[[id number]]  Abnormal            Final result               RBC and Platelet Morphology[[id number]]  Abnormal            Final result                 Please view results for these tests on the individual orders.   Hepatic panel (Albumin, ALT, AST, Bili, Alk Phos, TP)   Result Value Ref Range    Protein Total 8.4 (H) 6.4 - 8.3 g/dL    Albumin 5.0 3.5 - 5.2 g/dL    Bilirubin Total 0.7 <=1.2 mg/dL    Alkaline Phosphatase 66 40 - 150 U/L    AST 29 0 - 45 U/L    ALT 36 0 - 70 U/L    Bilirubin Direct <0.20 0.00 - 0.30 mg/dL   CBC with platelets and differential   Result Value Ref Range    WBC Count 7.7 4.0 - 11.0 10e3/uL    RBC Count 6.74 (H) 4.40 - 5.90 10e6/uL    Hemoglobin 12.9 (L) 13.3 - 17.7 g/dL    Hematocrit 41.9 40.0 - 53.0 %    MCV 62 (L) 78 - 100 fL    MCH 19.1 (L) 26.5 - 33.0 pg    MCHC 30.8 (L) 31.5 - 36.5 g/dL    RDW 18.7 (H) " 10.0 - 15.0 %    Platelet Count 215 150 - 450 10e3/uL    % Neutrophils 72 %    % Lymphocytes 20 %    % Monocytes 7 %    % Eosinophils 1 %    % Basophils 1 %    % Immature Granulocytes 0 %    NRBCs per 100 WBC 0 <1 /100    Absolute Neutrophils 5.5 1.6 - 8.3 10e3/uL    Absolute Lymphocytes 1.5 0.8 - 5.3 10e3/uL    Absolute Monocytes 0.5 0.0 - 1.3 10e3/uL    Absolute Eosinophils 0.1 0.0 - 0.7 10e3/uL    Absolute Basophils 0.1 0.0 - 0.2 10e3/uL    Absolute Immature Granulocytes 0.0 <=0.4 10e3/uL    Absolute NRBCs 0.0 10e3/uL   RBC and Platelet Morphology   Result Value Ref Range    RBC Morphology Confirmed RBC Indices     Platelet Assessment  Automated Count Confirmed. Platelet morphology is normal.     Automated Count Confirmed. Platelet morphology is normal.    Acanthocytes Slight (A) None Seen    Elliptocytes Slight (A) None Seen    RBC Fragments Slight (A) None Seen    Target Cells Slight (A) None Seen           Signed Electronically by: CAMILA Landaverde CNP

## 2024-07-24 ENCOUNTER — MYC MEDICAL ADVICE (OUTPATIENT)
Dept: FAMILY MEDICINE | Facility: CLINIC | Age: 24
End: 2024-07-24
Payer: COMMERCIAL

## 2024-07-24 LAB
ALBUMIN SERPL BCG-MCNC: 5 G/DL (ref 3.5–5.2)
ALP SERPL-CCNC: 66 U/L (ref 40–150)
ALT SERPL W P-5'-P-CCNC: 36 U/L (ref 0–70)
AST SERPL W P-5'-P-CCNC: 29 U/L (ref 0–45)
BILIRUB DIRECT SERPL-MCNC: <0.2 MG/DL (ref 0–0.3)
BILIRUB SERPL-MCNC: 0.7 MG/DL
PROT SERPL-MCNC: 8.4 G/DL (ref 6.4–8.3)

## 2024-07-24 PROCEDURE — 87338 HPYLORI STOOL AG IA: CPT

## 2024-07-24 NOTE — RESULT ENCOUNTER NOTE
Khang Felder,     Your blood counts are stable and actually slightly increased from when we checked them three weeks ago so I am less concerned about bleeding.     All other labs are normal.     Shawna CARLSON

## 2024-07-25 ENCOUNTER — NURSE TRIAGE (OUTPATIENT)
Dept: FAMILY MEDICINE | Facility: CLINIC | Age: 24
End: 2024-07-25
Payer: COMMERCIAL

## 2024-07-25 LAB — H PYLORI AG STL QL IA: NEGATIVE

## 2024-07-25 NOTE — TELEPHONE ENCOUNTER
"Patient was seen 7/23/24, see note regarding \"acute prostatitis\":    Acute prostatitis  Patient reports improvement in pain and decrease in urinary frequency. Continues to have pain which I plan to monitor and reevaluate in two weeks.           Re-flagged for in clinic RN team to call patient to triage.    Vesna LANDA RN  [facility] Triage    "
See Triage 07/25/24     MILI RuffinN RN  [facility]    
all other ROS negative except as per HPI

## 2024-07-25 NOTE — TELEPHONE ENCOUNTER
"Writer called patient to further triage mychart message. Patient notes that starting 7/24 morning scrotal pain worsened and new pain in lower L back present. Patient notes that pain is 5/10 in both locations, denies urinary concerns, notes that he has not checked his temperature but felt slightly feverish yesterday. Patient notes that he did take ibuprofen and scrotal pain improved but back pain is still present. Patient notes that he finished course of antibiotics last Saturday.    Writer advised that since pain is still present and is new with concerns for fever, patient was advised to be seen today 07/25/24. No openings in clinic, writer advised that patient be seen today 07/25/24.    CLAIRE Ruffin RN  [facility] [place]    Reason for Disposition   Pain comes and goes (intermittent) and present > 24 hours    Additional Information   Negative: Rash or color change of scrotum BUT no swelling or pain   Negative: Inguinal hernia pain (e.g., known hernia previously diagnosed by a doctor or NP/PA)   Negative: Followed a genital area injury (e.g., penis, scrotum)   Negative: Swelling of scrotum is main symptom   Negative: SEVERE pain (e.g., excruciating)   Negative: Swollen scrotum   Negative: Constant pain in scrotum or testicle and present > 1 hour   Negative: Vomiting   Negative: Fever > 100.4 F  (38.0 C)   Negative: Patient sounds very sick or weak to the triager   Negative: Scrotum looks infected (e.g., draining sore, ulcer, red rash)   Negative: Blood in urine (red, pink, or tea-colored)    Answer Assessment - Initial Assessment Questions  1. LOCATION and RADIATION: \"Where is the pain located?\"       Pain in scrotum, and left lower back  2. QUALITY: \"What does the pain feel like?\"  (e.g., sharp, dull, aching, burning)      Scrotum is sharp and burning, back has sharp pain as well  3. SEVERITY: \"How bad is the pain?\"  (Scale 1-10; or mild, moderate, severe)    - MILD (1-3): doesn't interfere with " "normal activities     - MODERATE (4-7): interferes with normal activities (e.g., work or school) or awakens from sleep    - SEVERE (8-10): excruciating pain, unable to do any normal activities, difficulty walking      -5/10  4. ONSET: \"When did the pain start?\"      Yesterday morning 7/24/24  5. PATTERN: \"Does it come and go, or has it been constant since it started?\"      constant  6. SCROTAL APPEARANCE: \"What does the scrotum look like?\" \"Is there any swelling or redness?\"       No  7. HERNIA: \"Has a doctor ever told you that you have a hernia?\"      No  8. OTHER SYMPTOMS: \"Do you have any other symptoms?\" (e.g., abdomen pain, difficulty passing urine, fever, vomiting)      Back pain, possible fever    Protocols used: Scrotum Pain-A-OH    "

## 2024-07-25 NOTE — RESULT ENCOUNTER NOTE
H. Pylori is negative. Continue to take the Prilosec as prescribed. Hope you are starting to feel better.     Shawna CARLSON

## 2024-07-27 ENCOUNTER — OFFICE VISIT (OUTPATIENT)
Dept: URGENT CARE | Facility: URGENT CARE | Age: 24
End: 2024-07-27
Payer: COMMERCIAL

## 2024-07-27 VITALS
DIASTOLIC BLOOD PRESSURE: 81 MMHG | BODY MASS INDEX: 19.73 KG/M2 | SYSTOLIC BLOOD PRESSURE: 121 MMHG | RESPIRATION RATE: 16 BRPM | WEIGHT: 120.4 LBS | TEMPERATURE: 97.9 F | OXYGEN SATURATION: 100 % | HEART RATE: 89 BPM

## 2024-07-27 DIAGNOSIS — N50.812 PAIN IN BOTH TESTICLES: Primary | ICD-10-CM

## 2024-07-27 DIAGNOSIS — N50.811 PAIN IN BOTH TESTICLES: Primary | ICD-10-CM

## 2024-07-27 DIAGNOSIS — Z87.438 HISTORY OF PROSTATITIS: ICD-10-CM

## 2024-07-27 LAB
ALBUMIN UR-MCNC: NEGATIVE MG/DL
APPEARANCE UR: CLEAR
BILIRUB UR QL STRIP: NEGATIVE
COLOR UR AUTO: YELLOW
GLUCOSE UR STRIP-MCNC: NEGATIVE MG/DL
HGB UR QL STRIP: NEGATIVE
KETONES UR STRIP-MCNC: NEGATIVE MG/DL
LEUKOCYTE ESTERASE UR QL STRIP: NEGATIVE
NITRATE UR QL: NEGATIVE
PH UR STRIP: 7 [PH] (ref 5–7)
SP GR UR STRIP: 1.01 (ref 1–1.03)
UROBILINOGEN UR STRIP-ACNC: 0.2 E.U./DL

## 2024-07-27 PROCEDURE — 87591 N.GONORRHOEAE DNA AMP PROB: CPT | Performed by: PHYSICIAN ASSISTANT

## 2024-07-27 PROCEDURE — 81003 URINALYSIS AUTO W/O SCOPE: CPT | Performed by: PHYSICIAN ASSISTANT

## 2024-07-27 PROCEDURE — 99213 OFFICE O/P EST LOW 20 MIN: CPT | Performed by: PHYSICIAN ASSISTANT

## 2024-07-27 PROCEDURE — 87491 CHLMYD TRACH DNA AMP PROBE: CPT | Performed by: PHYSICIAN ASSISTANT

## 2024-07-27 RX ORDER — SULFAMETHOXAZOLE/TRIMETHOPRIM 800-160 MG
1 TABLET ORAL 2 TIMES DAILY
Qty: 28 TABLET | Refills: 0 | Status: SHIPPED | OUTPATIENT
Start: 2024-07-27 | End: 2024-07-27

## 2024-07-27 RX ORDER — LEVOFLOXACIN 500 MG/1
500 TABLET, FILM COATED ORAL DAILY
Qty: 10 TABLET | Refills: 0 | Status: SHIPPED | OUTPATIENT
Start: 2024-07-27 | End: 2024-08-06

## 2024-07-27 ASSESSMENT — ENCOUNTER SYMPTOMS
ABDOMINAL PAIN: 0
CHILLS: 0
FREQUENCY: 1
BLOOD IN STOOL: 0
DIARRHEA: 0
FATIGUE: 0
FEVER: 0
NAUSEA: 0
WOUND: 0
CONSTIPATION: 0
DYSURIA: 0
HEMATURIA: 0
FLANK PAIN: 0
VOMITING: 0
PALPITATIONS: 0
COLOR CHANGE: 0
CARDIOVASCULAR NEGATIVE: 1

## 2024-07-27 ASSESSMENT — PAIN SCALES - GENERAL: PAINLEVEL: SEVERE PAIN (6)

## 2024-07-27 NOTE — PROGRESS NOTES
Deann Felder is a 24 year old, presenting for the following health issues:  Testicular/scrotal Pain and Back Pain (Pt complains of lower back pain, worsened at the end of day. )    HPI   Testicular/scrotal pain  Onset/Duration: 2-3days  Description:   Character: dull  Location: testicular/scrotal pain  Radiation: low back  Intensity: mild  Progression of Symptoms:  same  Accompanying Signs & Symptoms:  Fever/Chills: Yes  Gas/Bloating: no  Nausea: no  Vomitting: no  Diarrhea: no  Constipation: no  Dysuria or Hematuria: Reports burning sensation but no dysuria, urinary frequency, urgency or hematuria.  No penile discharge or lesions.  History:   Trauma: no  Previous similar pain: Yes, he just recently completed 6weeks of cipro for acute prostatitis with good relief until symptoms returned 2-3days ago.  Not sexually active.  Previous tests done: none  Precipitating factors:   Does the pain change with:     Food: no    Bowel Movement: no    Urination: no   Other factors:  no  Therapies tried and outcome: fluids, rest, tylenol with minimal relief     Patient Active Problem List   Diagnosis    Iron deficiency     Current Outpatient Medications   Medication Sig Dispense Refill    multivitamin w/minerals (MULTI-VITAMIN) tablet Take 1 tablet by mouth daily (Patient not taking: Reported on 7/23/2024) 90 tablet 3    omeprazole (PRILOSEC) 20 MG DR capsule Take 1 capsule (20 mg) by mouth 2 times daily for 14 days 28 capsule 0     No current facility-administered medications for this visit.      No Known Allergies    Review of Systems   Constitutional:  Negative for chills, fatigue and fever.   Cardiovascular: Negative.  Negative for chest pain, palpitations and leg swelling.   Gastrointestinal:  Negative for abdominal pain, blood in stool, constipation, diarrhea, nausea and vomiting.   Genitourinary:  Positive for frequency and testicular pain. Negative for dysuria, flank pain, genital sores, hematuria, penile  discharge, scrotal swelling and urgency.   Skin: Negative.  Negative for color change, pallor, rash and wound.   All other systems reviewed and are negative.          Objective    /81 (BP Location: Left arm, Patient Position: Sitting, Cuff Size: Adult Regular)   Pulse 89   Temp 97.9  F (36.6  C) (Tympanic)   Resp 16   Wt 54.6 kg (120 lb 6.4 oz)   SpO2 100%   BMI 19.73 kg/m    Body mass index is 19.73 kg/m .  Physical Exam  Vitals and nursing note reviewed.   Constitutional:       General: He is not in acute distress.     Appearance: Normal appearance. He is well-developed and normal weight. He is not ill-appearing.   Cardiovascular:      Rate and Rhythm: Normal rate and regular rhythm.      Pulses: Normal pulses.      Heart sounds: Normal heart sounds, S1 normal and S2 normal. No murmur heard.     No friction rub. No gallop.   Pulmonary:      Effort: Pulmonary effort is normal. No accessory muscle usage or respiratory distress.      Breath sounds: Normal breath sounds and air entry. No wheezing or rales.   Abdominal:      General: Abdomen is flat. Bowel sounds are normal.      Palpations: Abdomen is soft. There is no mass.      Tenderness: There is no abdominal tenderness. There is no right CVA tenderness, left CVA tenderness, guarding or rebound. Negative signs include Jones's sign, Rovsing's sign and McBurney's sign.      Hernia: No hernia is present. There is no hernia in the left inguinal area or right inguinal area.   Genitourinary:     Pubic Area: No rash.       Penis: Uncircumcised. No erythema, tenderness, discharge, swelling or lesions.       Testes: Cremasteric reflex is present.         Right: Tenderness present. Mass or swelling not present.         Left: Mass, tenderness or swelling not present.      Epididymis:      Right: Normal. Not inflamed or enlarged.      Left: Normal. Not inflamed or enlarged.   Skin:     General: Skin is warm and dry.   Neurological:      General: No focal deficit  present.      Mental Status: He is alert and oriented to person, place, and time.   Psychiatric:         Mood and Affect: Mood normal.         Behavior: Behavior normal.         Thought Content: Thought content normal.         Judgment: Judgment normal.       Results for orders placed or performed in visit on 07/27/24 (from the past 24 hour(s))   UA Macroscopic with reflex to Microscopic and Culture - Lab Collect    Specimen: Urine, Midstream   Result Value Ref Range    Color Urine Yellow Colorless, Straw, Light Yellow, Yellow    Appearance Urine Clear Clear    Glucose Urine Negative Negative mg/dL    Bilirubin Urine Negative Negative    Ketones Urine Negative Negative mg/dL    Specific Gravity Urine 1.015 1.003 - 1.035    Blood Urine Negative Negative    pH Urine 7.0 5.0 - 7.0    Protein Albumin Urine Negative Negative mg/dL    Urobilinogen Urine 0.2 0.2, 1.0 E.U./dL    Nitrite Urine Negative Negative    Leukocyte Esterase Urine Negative Negative    Narrative    Microscopic not indicated             Assessment/Plan:  Pain in both testicles:  UA was normal, will check gc/chlamydia.  He has failed bactrim DS and cipro.  Testicular US has been normal.  Will give levaquin [id number] and send to urology for further evaluation and management.  Tylenol/ibuprofen as needed for pain.  Recheck in clinic if symptoms worsen or if symptoms do not improve.  -     UA Macroscopic with reflex to Microscopic and Culture - Lab Collect; Future  -     Chlamydia trachomatis/Neisseria gonorrhoeae by PCR - Clinic Collect  -     UA Macroscopic with reflex to Microscopic and Culture - Lab Collect  -     Adult Urology  Referral; Future  -     levofloxacin (LEVAQUIN) 500 MG tablet; Take 1 tablet (500 mg) by mouth daily for 10 days    History of prostatitis        Re Da Silva PA-C

## 2024-07-28 ENCOUNTER — HEALTH MAINTENANCE LETTER (OUTPATIENT)
Age: 24
End: 2024-07-28

## 2024-07-29 LAB
C TRACH DNA SPEC QL PROBE+SIG AMP: NEGATIVE
N GONORRHOEA DNA SPEC QL NAA+PROBE: NEGATIVE

## 2024-08-07 ENCOUNTER — OFFICE VISIT (OUTPATIENT)
Dept: FAMILY MEDICINE | Facility: CLINIC | Age: 24
End: 2024-08-07
Payer: COMMERCIAL

## 2024-08-07 VITALS
HEART RATE: 92 BPM | DIASTOLIC BLOOD PRESSURE: 70 MMHG | WEIGHT: 121.6 LBS | RESPIRATION RATE: 20 BRPM | BODY MASS INDEX: 19.54 KG/M2 | TEMPERATURE: 98.2 F | OXYGEN SATURATION: 100 % | HEIGHT: 66 IN | SYSTOLIC BLOOD PRESSURE: 107 MMHG

## 2024-08-07 DIAGNOSIS — K27.9 PEPTIC ULCER: ICD-10-CM

## 2024-08-07 DIAGNOSIS — N41.0 ACUTE PROSTATITIS: Primary | ICD-10-CM

## 2024-08-07 PROCEDURE — 99214 OFFICE O/P EST MOD 30 MIN: CPT

## 2024-08-07 NOTE — PROGRESS NOTES
Assessment & Plan     Acute prostatitis  Improved, patient went to ER and was prescribed levofloxacin and took one tablet but had side effects so stopped taking medication after once dose. Since then, he reports that his symptoms have improved. He reports a decrease in urinary frequency and continues to have infrequent pain to his scrotum but overall reports an improvement. Because of this, plan for patient to continue to monitor symptoms. If his symptoms get worst, plan for patient to see urology.     Peptic ulcer  Patient reports improvement to stomach pain, in AM has slight pain but improves with food. He does continue to belch but this has also improved. He continues to have specks of black in stools but also reports that this has improved. Plan for patient to take omeprazole 20mg twice a day for two weeks and then decreases to once daily for 7 days.   - omeprazole (PRILOSEC) 20 MG DR capsule; Take 1 capsule (20 mg) by mouth 2 times daily for 14 days, THEN 1 capsule (20 mg) daily for 7 days.    Deann Felder is a 24 year old, presenting for the following health issues:  RECHECK      8/7/2024     7:56 AM   Additional Questions   Roomed by peg corona     History of Present Illness       Reason for visit:  Follow up    He eats 0-1 servings of fruits and vegetables daily.He consumes 0 sweetened beverage(s) daily.He exercises with enough effort to increase his heart rate 9 or less minutes per day.  He exercises with enough effort to increase his heart rate 4 days per week.   He is taking medications regularly.           Follow up on peptic ulcer and prostatitis  Still have upper left pain in the morning but improves after breakfast.  Belching after eating.  Prostatitis 2-3 times a day gets a sharp pain for 30 seconds then goes away. Improving. Still has the frequent urination  Urinating every two hours but there have been days where frequent urination has resolved.  Pain in the mornings, still has some black  "spots in stool but improving.         Objective    /70 (BP Location: Right arm, Patient Position: Sitting, Cuff Size: Adult Regular)   Pulse 92   Temp 98.2  F (36.8  C) (Oral)   Resp 20   Ht 1.664 m (5' 5.5\")   Wt 55.2 kg (121 lb 9.6 oz)   SpO2 100%   BMI 19.93 kg/m    Body mass index is 19.93 kg/m .  Physical Exam   GENERAL: alert and no distress  ABDOMEN: soft, nontender, no hepatosplenomegaly, no masses and bowel sounds normal            Signed Electronically by: CAMILA Landaverde CNP    "

## 2024-08-21 ENCOUNTER — MYC MEDICAL ADVICE (OUTPATIENT)
Dept: FAMILY MEDICINE | Facility: CLINIC | Age: 24
End: 2024-08-21
Payer: COMMERCIAL

## 2024-08-21 DIAGNOSIS — K27.9 PUD (PEPTIC ULCER DISEASE): Primary | ICD-10-CM

## 2024-08-21 NOTE — TELEPHONE ENCOUNTER
Routing [email or web address] message to provider.    Patient asking if should go down to once daily omeprazole or stay at twice daily.  Please advise.        Peptic ulcer  Patient reports improvement to stomach pain, in AM has slight pain but improves with food. He does continue to belch but this has also improved. He continues to have specks of black in stools but also reports that this has improved. Plan for patient to take omeprazole 20mg twice a day for two weeks and then decreases to once daily for 7 days.   - omeprazole (PRILOSEC) 20 MG DR capsule; Take 1 capsule (20 mg) by mouth 2 times daily for 14 days, THEN 1 capsule (20 mg) daily for 7 days.          Christine M Klisch, RN

## 2024-08-23 ENCOUNTER — TELEPHONE (OUTPATIENT)
Dept: GASTROENTEROLOGY | Facility: CLINIC | Age: 24
End: 2024-08-23
Payer: COMMERCIAL

## 2024-08-23 NOTE — TELEPHONE ENCOUNTER
Pt called in on own.     Pre assessment completed for upcoming procedure.      Procedure details:    Patient scheduled for Upper endoscopy (EGD) on 8/30/24.     Arrival time: 0900. Procedure time 0945    Facility location: [facility]; [address], [place] [address]. Check in location: 2nd Floor at Surgery desk.    Sedation type: Conscious sedation     Pre op exam needed? No.    Indication for procedure: PUD (peptic ulcer disease) [K27.9]  - Primary     Designated  policy reviewed. Instructed to have someone stay 6 hours post procedure.       Chart review:     Electronic implanted devices? No    Recent diagnosis of diverticulitis within the last 6 weeks?  N/A      Medication review:    Diabetic? No    Anticoagulants? No    Weight loss medication/injectable? No.    NSAIDS? No    Other medication HOLDING recommendations:  EGD: No holding of PPIs/Acid reducing medication(s) needed. Due to noted gastric/duodenal ulcer.  and previously completed EGD and put on PPI/acid reducing medication.       Prep for procedure:     Bowel prep recommendation: N/A    Prep instructions sent via [employer]     Reviewed procedure prep instructions. Pt noted no known gastroparesis, achalasia, or delayed gastric emptying, pt will follow standard NPO guidelines.     Patient verbalized understanding and had no questions or concerns at this time.        Deborah Guallpa RN  Endoscopy Procedure Pre Assessment   [phone] option 4

## 2024-08-23 NOTE — TELEPHONE ENCOUNTER
"Endoscopy Scheduling Screen    Have you had a positive Covid test in the last 14 days?  No    What is your communication preference for Instructions and/or Bowel Prep?   MyChart    What insurance is in the chart?  Other:  [facility]     Ordering/Referring Provider: [id number]     (If ordering provider performs procedure, schedule with ordering provider unless otherwise instructed. )    BMI: Estimated body mass index is 19.93 kg/m  as calculated from the following:    Height as of 8/7/24: 1.664 m (5' 5.5\").    Weight as of 8/7/24: 55.2 kg (121 lb 9.6 oz).     Sedation Ordered  moderate sedation.   If patient BMI > 50 do not schedule in [facility].    If patient BMI > 45 do not schedule at [facility].    Are you taking methadone or Suboxone?  No    Have you had difficulties, pain, or discomfort during past endoscopy procedures?  No    Are you taking any prescription medications for pain 3 or more times per week?   NO, No RN review required.    Do you have a history of malignant hyperthermia?  No    (Females) Are you currently pregnant?   No     Have you been diagnosed or told you have pulmonary hypertension?   No    Do you have an LVAD?  No    Have you been told you have moderate to severe sleep apnea?  No    Have you been told you have COPD, asthma, or any other lung disease?  No    Do you have any heart conditions?  No     Have you ever had or are you waiting for an organ transplant?  No. Continue scheduling, no site restrictions.    Have you had a stroke or transient ischemic attack (TIA aka \"mini stroke\" in the last 6 months?   No    Have you been diagnosed with or been told you have cirrhosis of the liver?   No    Are you currently on dialysis?   No    Do you need assistance transferring?   No    BMI: Estimated body mass index is 19.93 kg/m  as calculated from the following:    Height as of 8/7/24: 1.664 m (5' 5.5\").    Weight as of 8/7/24: 55.2 kg (121 lb 9.6 oz).     Is patients BMI > 40 and scheduling location [facility]?  No    Do you " take an injectable medication for weight loss or diabetes (excluding insulin)?  No    Do you take the medication Naltrexone?  No    Do you take blood thinners?  No       Prep   Are you currently on dialysis or do you have chronic kidney disease?  No    Do you have a diagnosis of diabetes?  No    Do you have a diagnosis of cystic fibrosis (CF)?  No    On a regular basis do you go 3 -5 days between bowel movements?  No    BMI > 40?  No    Preferred Pharmacy:    [address] [place], [place] - [address] [place] [address] [address]  [place] [address]  Phone: [phone] Fax: [phone]      Final Scheduling Details     Procedure scheduled  Upper endoscopy (EGD)    Surgeon:  Tor      Date of procedure:  08/30/2024     Pre-OP / PAC:   No - Not required for this site.    Location  [facility] - [facility] - Per order.    Sedation   Moderate Sedation - Per order.      Patient Reminders:   You will receive a call from a Nurse to review instructions and health history.  This assessment must be completed prior to your procedure.  Failure to complete the Nurse assessment may result in the procedure being cancelled.      On the day of your procedure, please designate an adult(s) who can drive you home stay with you for the next 24 hours. The medicines used in the exam will make you sleepy. You will not be able to drive.      You cannot take public transportation, ride share services, or non-medical taxi service without a responsible caregiver.  Medical transport services are allowed with the requirement that a responsible caregiver will receive you at your destination.  We require that drivers and caregivers are confirmed prior to your procedure.

## 2024-08-26 ENCOUNTER — MYC MEDICAL ADVICE (OUTPATIENT)
Dept: FAMILY MEDICINE | Facility: CLINIC | Age: 24
End: 2024-08-26
Payer: COMMERCIAL

## 2024-08-26 DIAGNOSIS — K27.9 PEPTIC ULCER: ICD-10-CM

## 2024-08-30 ENCOUNTER — HOSPITAL ENCOUNTER (OUTPATIENT)
Facility: AMBULATORY SURGERY CENTER | Age: 24
Discharge: HOME | End: 2024-08-30
Attending: INTERNAL MEDICINE | Admitting: INTERNAL MEDICINE
Payer: COMMERCIAL

## 2024-08-30 VITALS
OXYGEN SATURATION: 100 % | HEART RATE: 97 BPM | SYSTOLIC BLOOD PRESSURE: 119 MMHG | DIASTOLIC BLOOD PRESSURE: 74 MMHG | TEMPERATURE: 98.1 F | RESPIRATION RATE: 16 BRPM

## 2024-08-30 LAB — UPPER GI ENDOSCOPY: NORMAL

## 2024-08-30 PROCEDURE — 43239 EGD BIOPSY SINGLE/MULTIPLE: CPT

## 2024-08-30 PROCEDURE — G8918 PT W/O PREOP ORDER IV AB PRO: HCPCS

## 2024-08-30 PROCEDURE — 88305 TISSUE EXAM BY PATHOLOGIST: CPT | Performed by: STUDENT IN AN ORGANIZED HEALTH CARE EDUCATION/TRAINING PROGRAM

## 2024-08-30 PROCEDURE — 88342 IMHCHEM/IMCYTCHM 1ST ANTB: CPT | Performed by: STUDENT IN AN ORGANIZED HEALTH CARE EDUCATION/TRAINING PROGRAM

## 2024-08-30 PROCEDURE — G8907 PT DOC NO EVENTS ON DISCHARG: HCPCS

## 2024-08-30 RX ORDER — FENTANYL CITRATE 50 UG/ML
INJECTION, SOLUTION INTRAMUSCULAR; INTRAVENOUS PRN
Status: DISCONTINUED | OUTPATIENT
Start: 2024-08-30 | End: 2024-08-30 | Stop reason: HOSPADM

## 2024-08-30 NOTE — H&P
[facility] Anesthesia Pre-op History and Physical    Dee Helm MRN# [id number]   Age: 24 year old YOB: 2000            Date of Exam 8/30/2024       Primary care provider: Shawna Pat         Chief Complaint and/or Reason for Procedure:     Epigastric pain, concern for PUD       Active problem list:     Patient Active Problem List    Diagnosis Date Noted    Iron deficiency 07/03/2024     Priority: Medium            Medications (include herbals and vitamins):   Any Plavix use in the last 7 days? No     Current Outpatient Medications   Medication Sig Dispense Refill    multivitamin w/minerals (MULTI-VITAMIN) tablet Take 1 tablet by mouth daily 90 tablet 3    omeprazole (PRILOSEC) 20 MG DR capsule Take 1 capsule (20 mg) by mouth 2 times daily. 90 capsule 0     No current facility-administered medications for this encounter.             Allergies:    No Known Allergies  Allergy to Latex? No  Allergy to tape?   No  Intolerances:             Physical Exam:   All vitals have been reviewed  Patient Vitals for the past 8 hrs:   BP Temp Temp src Pulse Resp SpO2   08/30/24 0916 115/72 98.1  F (36.7  C) Temporal 88 14 100 %     No intake/output data recorded.  Lungs:   no increased work of breathing     Cardiovascular:   RRR             Lab / Radiology Results:         Anesthetic risk and/or ASA classification:   1    Chinyere Arellano DO

## 2024-09-04 ENCOUNTER — OFFICE VISIT (OUTPATIENT)
Dept: FAMILY MEDICINE | Facility: CLINIC | Age: 24
End: 2024-09-04
Payer: COMMERCIAL

## 2024-09-04 VITALS
SYSTOLIC BLOOD PRESSURE: 112 MMHG | TEMPERATURE: 97.8 F | HEART RATE: 79 BPM | DIASTOLIC BLOOD PRESSURE: 71 MMHG | RESPIRATION RATE: 16 BRPM | OXYGEN SATURATION: 100 % | WEIGHT: 120.4 LBS | BODY MASS INDEX: 19.35 KG/M2 | HEIGHT: 66 IN

## 2024-09-04 DIAGNOSIS — R51.9 NONINTRACTABLE HEADACHE, UNSPECIFIED CHRONICITY PATTERN, UNSPECIFIED HEADACHE TYPE: ICD-10-CM

## 2024-09-04 DIAGNOSIS — L60.0 INGROWN TOENAIL: ICD-10-CM

## 2024-09-04 DIAGNOSIS — K29.00 ACUTE GASTRITIS WITHOUT HEMORRHAGE, UNSPECIFIED GASTRITIS TYPE: Primary | ICD-10-CM

## 2024-09-04 LAB
PATH REPORT.ADDENDUM SPEC: NORMAL
PATH REPORT.COMMENTS IMP SPEC: NORMAL
PATH REPORT.COMMENTS IMP SPEC: NORMAL
PATH REPORT.FINAL DX SPEC: NORMAL
PATH REPORT.GROSS SPEC: NORMAL
PATH REPORT.MICROSCOPIC SPEC OTHER STN: NORMAL
PATH REPORT.RELEVANT HX SPEC: NORMAL
PHOTO IMAGE: NORMAL

## 2024-09-04 PROCEDURE — 99214 OFFICE O/P EST MOD 30 MIN: CPT

## 2024-09-04 PROCEDURE — G2211 COMPLEX E/M VISIT ADD ON: HCPCS

## 2024-09-04 NOTE — PROGRESS NOTES
Assessment & Plan     Acute gastritis without hemorrhage, unspecified gastritis type  Patient completed EGD and found to have gastritis. Samples collected for H.Pylori testing and awaiting results. Plan for patient to continue to use omeprazole as instructed by GI provider.    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 Headache and slight redness to eye is most likely due to this. Plan for patient to take frequent short breaks away from the computer to prevent headaches and eye strain and continue to consume water.     Ingrown toenail  Left great toe. Supportive measures encouraged. No signs of infection.     The longitudinal plan of care for the diagnosis(es)/condition(s) as documented were addressed during this visit. Due to the added complexity in care, I will continue to support [place] in the subsequent management and with ongoing continuity of care.    Deann Felder is a 24 year old, presenting for the following health issues:  Follow Up (Procedure and symptoms)      9/4/2024     8:18 AM   Additional Questions   Roomed by peg corona     History of Present Illness       Reason for visit:  Follow up and shomach pain    He eats 2-3 servings of fruits and vegetables daily.He consumes 0 sweetened beverage(s) daily.He exercises with enough effort to increase his heart rate 9 or less minutes per day.  He exercises with enough effort to increase his heart rate 3 or less days per week.   He is taking medications regularly.           Pt reports he is following up on his gastro procedure and he is also following up on symptoms  Abdominal pain after meals and dark stools. Not black. Continues to have daily, formed bowel movements.   Has a slight headache since yesterday.         Objective    /71 (BP Location: Right arm, Patient Position: Sitting, Cuff Size: Adult Regular)   Pulse  "79   Temp 97.8  F (36.6  C) (Temporal)   Resp 16   Ht 1.664 m (5' 5.5\")   Wt 54.6 kg (120 lb 6.4 oz)   SpO2 100%   BMI 19.73 kg/m    Body mass index is 19.73 kg/m .  Physical Exam   GENERAL: alert and no distress  EYES: conjunctiva/corneas- slight redness to medial aspect of conjunctiva.   RESP: lungs clear to auscultation - no rales, rhonchi or wheezes  CV: regular rate and rhythm, normal S1 S2, no S3 or S4, no murmur, click or rub, no peripheral edema  ABDOMEN: soft, nontender, no hepatosplenomegaly, no masses and bowel sounds normal  SKIN: Periungular fold, Medial aspect of great toe nail slightly raised and inflamed. No redness or warmth.             Signed Electronically by: CAMILA Landaverde CNP    "

## 2024-09-07 ENCOUNTER — MYC MEDICAL ADVICE (OUTPATIENT)
Dept: FAMILY MEDICINE | Facility: CLINIC | Age: 24
End: 2024-09-07
Payer: COMMERCIAL

## 2024-09-10 NOTE — TELEPHONE ENCOUNTER
Routing My chart message to Shawna Pat    Patient continues with GI symptoms    Office visit  9/4        Pt reports he is following up on his gastro procedure and he is also following up on symptoms  Abdominal pain after meals and dark stools. Not black. Continues to have daily, formed bowel movements.   Has a slight headache since yesterday.       Leo Salazar RN, BSN, PHN  [facility]

## 2024-10-21 ENCOUNTER — MYC MEDICAL ADVICE (OUTPATIENT)
Dept: FAMILY MEDICINE | Facility: CLINIC | Age: 24
End: 2024-10-21
Payer: COMMERCIAL

## 2024-10-22 NOTE — TELEPHONE ENCOUNTER
"Per LOV notes,  \"Pt reports he is following up on his gastro procedure and he is also following up on symptoms  Abdominal pain after meals and dark stools. Not black. Continues to have daily, formed bowel movements.   Has a slight headache since yesterday.\"     Sent message to patient to clarify \"dark stools\" have not worsened.    Shawna Marcos RN   "

## 2024-12-28 ENCOUNTER — OFFICE VISIT (OUTPATIENT)
Dept: URGENT CARE | Facility: URGENT CARE | Age: 24
End: 2024-12-28
Payer: COMMERCIAL

## 2024-12-28 VITALS
RESPIRATION RATE: 20 BRPM | SYSTOLIC BLOOD PRESSURE: 120 MMHG | DIASTOLIC BLOOD PRESSURE: 93 MMHG | TEMPERATURE: 100.4 F | WEIGHT: 118.4 LBS | HEART RATE: 124 BPM | BODY MASS INDEX: 19.4 KG/M2 | OXYGEN SATURATION: 100 %

## 2024-12-28 DIAGNOSIS — J10.1 INFLUENZA A: Primary | ICD-10-CM

## 2024-12-28 DIAGNOSIS — R50.9 FEVER, UNSPECIFIED FEVER CAUSE: ICD-10-CM

## 2024-12-28 LAB
DEPRECATED S PYO AG THROAT QL EIA: NEGATIVE
FLUAV AG SPEC QL IA: POSITIVE
FLUBV AG SPEC QL IA: NEGATIVE

## 2024-12-28 PROCEDURE — 87635 SARS-COV-2 COVID-19 AMP PRB: CPT | Performed by: PHYSICIAN ASSISTANT

## 2024-12-28 PROCEDURE — 87804 INFLUENZA ASSAY W/OPTIC: CPT | Performed by: PHYSICIAN ASSISTANT

## 2024-12-28 PROCEDURE — 99214 OFFICE O/P EST MOD 30 MIN: CPT | Performed by: PHYSICIAN ASSISTANT

## 2024-12-28 PROCEDURE — 87651 STREP A DNA AMP PROBE: CPT | Performed by: PHYSICIAN ASSISTANT

## 2024-12-28 RX ORDER — OSELTAMIVIR PHOSPHATE 75 MG/1
75 CAPSULE ORAL 2 TIMES DAILY
Qty: 10 CAPSULE | Refills: 0 | Status: SHIPPED | OUTPATIENT
Start: 2024-12-28 | End: 2025-01-02

## 2024-12-28 ASSESSMENT — ENCOUNTER SYMPTOMS
WHEEZING: 0
ABDOMINAL PAIN: 0
GASTROINTESTINAL NEGATIVE: 1
DYSURIA: 0
SORE THROAT: 0
FEVER: 1
COUGH: 1
DIARRHEA: 0
HEADACHES: 0
PALPITATIONS: 0
NEUROLOGICAL NEGATIVE: 1
HEMATURIA: 0
CARDIOVASCULAR NEGATIVE: 1
ALLERGIC/IMMUNOLOGIC NEGATIVE: 1
NAUSEA: 0
CHILLS: 0
VOMITING: 0
SHORTNESS OF BREATH: 0
MUSCULOSKELETAL NEGATIVE: 1
MYALGIAS: 0
FREQUENCY: 0
CHEST TIGHTNESS: 0

## 2024-12-28 NOTE — PROGRESS NOTES
Chief Complaint:     Chief Complaint   Patient presents with    Fever     Started yesterday    Cough     Mild; mucus    Chills       Results for orders placed or performed in visit on 12/28/24   Streptococcus A Rapid Screen w/Reflex to PCR - Clinic Collect     Status: Normal    Specimen: Throat; Swab   Result Value Ref Range    Group A Strep antigen Negative Negative       Medical Decision Making:    Vital signs reviewed by Kem Garcia PA-C  BP (!) 120/93   Pulse (!) 124   Temp 100.4  F (38  C) (Oral)   Resp 20   Wt 53.7 kg (118 lb 6.4 oz)   SpO2 100%   BMI 19.40 kg/m      Differential Diagnosis:  [facility] Adult/Peds:  Bronchitis-viral, Influenza, Pneumonia, Strep pharyngitis, Tonsilitis, Viral pharyngitis, Viral syndrome, and Viral upper respiratory illness        ASSESSMENT    1. Fever, unspecified fever cause        PLAN    Patient is in no acute distress.    Temp is 100.4 in clinic today, lung sounds were clear, and O2 sats at 100% on RA.    RST was negative.  We will call with PCR results only if positive.  Influenza was positive for A.  Rx for Tamiflu sent in.  COVID swab collected in clinic today.  Rest, Push fluids, vaporizer, elevation of head of bed.  Ibuprofen and or Tylenol for any fever or body aches.  Over the counter cough suppressant- PRN- as discussed.   If symptoms worsen, recheck immediately otherwise follow up with your PCP in 1 week if symptoms are not improving.  Worrisome symptoms discussed with instructions to go to the ED.  Patient verbalized understanding and agreed with this plan.    Labs:    Results for orders placed or performed in visit on 12/28/24   Streptococcus A Rapid Screen w/Reflex to PCR - Clinic Collect     Status: Normal    Specimen: Throat; Swab   Result Value Ref Range    Group A Strep antigen Negative Negative        Vital signs reviewed by Kem Garcia PA-C  BP (!) 120/93   Pulse (!) 124   Temp 100.4  F (38  C) (Oral)   Resp 20   Wt 53.7 kg (118 lb 6.4 oz)    SpO2 100%   BMI 19.40 kg/m      Current Meds      Current Outpatient Medications:     omeprazole (PRILOSEC) 20 MG DR capsule, Take 1 capsule (20 mg) by mouth 2 times daily. (Patient not taking: Reported on 12/28/2024), Disp: 90 capsule, Rfl: 0      Respiratory History    occasional episodes of bronchitis      SUBJECTIVE    HPI: Dee Helm is an 24 year old male who presents with chest congestion, cough nonproductive, occasional, and fever.  Symptoms began 1  days ago and has unchanged.  There is no shortness of breath, wheezing, and chest pain.  Patient is eating and drinking well.  No nausea, vomiting, or diarrhea.    Patient denies any recent travel or exposure to known COVID positive tested individual.      ROS:     Review of Systems   Constitutional:  Positive for fever. Negative for chills.   HENT:  Positive for congestion. Negative for sore throat.    Respiratory:  Positive for cough. Negative for chest tightness, shortness of breath and wheezing.    Cardiovascular: Negative.  Negative for chest pain and palpitations.   Gastrointestinal: Negative.  Negative for abdominal pain, diarrhea, nausea and vomiting.   Genitourinary:  Negative for dysuria, frequency, hematuria and urgency.   Musculoskeletal: Negative.  Negative for myalgias.   Skin:  Negative for rash.   Allergic/Immunologic: Negative.  Negative for immunocompromised state.   Neurological: Negative.  Negative for headaches.         Family History   Family History   Problem Relation Age of Onset    Diabetes Father     Hypertension Father         Problem history  Patient Active Problem List   Diagnosis    Iron deficiency        Allergies  No Known Allergies     Social History  Social History     Socioeconomic History    Marital status: Single     Spouse name: Not on file    Number of children: Not on file    Years of education: Not on file    Highest education level: Not on file   Occupational History    Not on file   Tobacco Use     Smoking status: Never    Smokeless tobacco: Never   Vaping Use    Vaping status: Never Used   Substance and Sexual Activity    Alcohol use: Never    Drug use: Never    Sexual activity: Never   Other Topics Concern    Parent/sibling w/ CABG, MI or angioplasty before 65F 55M? No   Social History Narrative    Not on file     Social Drivers of Health     Financial Resource Strain: Low Risk  (6/3/2024)    Financial Resource Strain     Within the past 12 months, have you or your family members you live with been unable to get utilities (heat, electricity) when it was really needed?: No   Food Insecurity: Low Risk  (6/3/2024)    Food Insecurity     Within the past 12 months, did you worry that your food would run out before you got money to buy more?: No     Within the past 12 months, did the food you bought just not last and you didn t have money to get more?: No   Transportation Needs: Low Risk  (6/3/2024)    Transportation Needs     Within the past 12 months, has lack of transportation kept you from medical appointments, getting your medicines, non-medical meetings or appointments, work, or from getting things that you need?: No   Physical Activity: Not on file   Stress: Not on file   Social Connections: Not on file   Interpersonal Safety: Low Risk  (8/30/2024)    Interpersonal Safety     Do you feel physically and emotionally safe where you currently live?: Yes     Within the past 12 months, have you been hit, slapped, kicked or otherwise physically hurt by someone?: No     Within the past 12 months, have you been humiliated or emotionally abused in other ways by your partner or ex-partner?: No   Housing Stability: Low Risk  (6/3/2024)    Housing Stability     Do you have housing? : Yes     Are you worried about losing your housing?: No        OBJECTIVE     Vital signs reviewed by Kem Garcia PA-C  BP (!) 120/93   Pulse (!) 124   Temp 100.4  F (38  C) (Oral)   Resp 20   Wt 53.7 kg (118 lb 6.4 oz)   SpO2 100%    BMI 19.40 kg/m       Physical Exam  Vitals reviewed.   Constitutional:       General: He is not in acute distress.     Appearance: He is well-developed. He is not ill-appearing, toxic-appearing or diaphoretic.   HENT:      Head: Normocephalic and atraumatic.      Right Ear: Hearing, tympanic membrane, ear canal and external ear normal. No drainage, swelling or tenderness. Tympanic membrane is not perforated, erythematous, retracted or bulging.      Left Ear: Hearing, tympanic membrane, ear canal and external ear normal. No drainage, swelling or tenderness. Tympanic membrane is not perforated, erythematous, retracted or bulging.      Nose: Congestion and rhinorrhea present. No nasal tenderness or mucosal edema.      Right Turbinates: Not enlarged or swollen.      Left Turbinates: Not enlarged or swollen.      Right Sinus: No maxillary sinus tenderness or frontal sinus tenderness.      Left Sinus: No maxillary sinus tenderness or frontal sinus tenderness.      Mouth/Throat:      Pharynx: Posterior oropharyngeal erythema present. No pharyngeal swelling, oropharyngeal exudate or uvula swelling.      Tonsils: No tonsillar exudate. 0 on the right. 0 on the left.   Eyes:      General: Lids are normal.         Right eye: No discharge.         Left eye: No discharge.      Conjunctiva/sclera: Conjunctivae normal.      Right eye: Right conjunctiva is not injected. No exudate.     Left eye: Left conjunctiva is not injected. No exudate.     Pupils: Pupils are equal, round, and reactive to light.   Cardiovascular:      Rate and Rhythm: Normal rate and regular rhythm.      Heart sounds: Normal heart sounds. No murmur heard.     No friction rub. No gallop.   Pulmonary:      Effort: Pulmonary effort is normal. No accessory muscle usage, respiratory distress or retractions.      Breath sounds: Normal breath sounds and air entry. No stridor, decreased air movement or transmitted upper airway sounds. No decreased breath sounds,  wheezing, rhonchi or rales.   Chest:      Chest wall: No tenderness.   Abdominal:      General: Bowel sounds are normal. There is no distension.      Palpations: Abdomen is soft. Abdomen is not rigid. There is no mass.      Tenderness: There is no abdominal tenderness. There is no guarding or rebound.   Musculoskeletal:         General: Normal range of motion.      Cervical back: Normal range of motion and neck supple.   Lymphadenopathy:      Head:      Right side of head: No submental, submandibular, tonsillar, preauricular or posterior auricular adenopathy.      Left side of head: No submental, submandibular, tonsillar, preauricular or posterior auricular adenopathy.      Cervical:      Right cervical: No superficial or posterior cervical adenopathy.     Left cervical: No superficial or posterior cervical adenopathy.   Skin:     General: Skin is warm.      Capillary Refill: Capillary refill takes less than 2 seconds.   Neurological:      Mental Status: He is alert and oriented to person, place, and time.      Cranial Nerves: No cranial nerve deficit.      Sensory: No sensory deficit.      Motor: No abnormal muscle tone.      Coordination: Coordination normal.      Deep Tendon Reflexes: Reflexes normal.   Psychiatric:         Behavior: Behavior normal. Behavior is cooperative.         Thought Content: Thought content normal.         Judgment: Judgment normal.           Kem Garcia PA-C  12/28/2024, 4:30 PM

## 2024-12-29 LAB
S PYO DNA THROAT QL NAA+PROBE: NOT DETECTED
SARS-COV-2 RNA RESP QL NAA+PROBE: NEGATIVE

## 2025-08-10 ENCOUNTER — HEALTH MAINTENANCE LETTER (OUTPATIENT)
Age: 25
End: 2025-08-10

## (undated) RX ORDER — DIPHENHYDRAMINE HYDROCHLORIDE 50 MG/ML
INJECTION INTRAMUSCULAR; INTRAVENOUS
Status: DISPENSED
Start: 2024-08-30

## (undated) RX ORDER — FENTANYL CITRATE 50 UG/ML
INJECTION, SOLUTION INTRAMUSCULAR; INTRAVENOUS
Status: DISPENSED
Start: 2024-08-30